# Patient Record
Sex: MALE | URBAN - METROPOLITAN AREA
[De-identification: names, ages, dates, MRNs, and addresses within clinical notes are randomized per-mention and may not be internally consistent; named-entity substitution may affect disease eponyms.]

---

## 2022-08-12 ENCOUNTER — WEB ENCOUNTER (OUTPATIENT)
Dept: URBAN - METROPOLITAN AREA CLINIC 96 | Facility: CLINIC | Age: 47
End: 2022-08-12

## 2022-08-16 ENCOUNTER — TELEPHONE ENCOUNTER (OUTPATIENT)
Dept: URBAN - METROPOLITAN AREA CLINIC 92 | Facility: CLINIC | Age: 47
End: 2022-08-16

## 2022-08-17 ENCOUNTER — DASHBOARD ENCOUNTERS (OUTPATIENT)
Age: 47
End: 2022-08-17

## 2022-08-17 ENCOUNTER — OFFICE VISIT (OUTPATIENT)
Dept: URBAN - METROPOLITAN AREA CLINIC 96 | Facility: CLINIC | Age: 47
End: 2022-08-17
Payer: COMMERCIAL

## 2022-08-17 VITALS
WEIGHT: 310 LBS | HEART RATE: 74 BPM | RESPIRATION RATE: 17 BRPM | OXYGEN SATURATION: 97 % | BODY MASS INDEX: 41.99 KG/M2 | DIASTOLIC BLOOD PRESSURE: 96 MMHG | SYSTOLIC BLOOD PRESSURE: 169 MMHG | TEMPERATURE: 97.8 F | HEIGHT: 72 IN

## 2022-08-17 DIAGNOSIS — K59.00 CONSTIPATION, UNSPECIFIED CONSTIPATION TYPE: ICD-10-CM

## 2022-08-17 DIAGNOSIS — E66.01 MORBID (SEVERE) OBESITY DUE TO EXCESS CALORIES: ICD-10-CM

## 2022-08-17 DIAGNOSIS — Z87.19 HISTORY OF DIVERTICULITIS: ICD-10-CM

## 2022-08-17 DIAGNOSIS — R19.4 CHANGE IN BOWEL HABITS: ICD-10-CM

## 2022-08-17 PROBLEM — 408512008: Status: ACTIVE | Noted: 2022-08-17

## 2022-08-17 PROBLEM — 83911000119104: Status: ACTIVE | Noted: 2022-08-17

## 2022-08-17 PROCEDURE — 99204 OFFICE O/P NEW MOD 45 MIN: CPT | Performed by: INTERNAL MEDICINE

## 2022-08-17 RX ORDER — MULTIVIT-MIN/IRON/FOLIC ACID/K 18-600-40
AS DIRECTED CAPSULE ORAL
Status: ACTIVE | COMMUNITY

## 2022-08-17 RX ORDER — LOSARTAN POTASSIUM 100 MG/1
1 TABLET TABLET ORAL ONCE A DAY
Status: ACTIVE | COMMUNITY

## 2022-08-17 RX ORDER — B-COMPLEX WITH VITAMIN C
1 TABLET TABLET ORAL ONCE A DAY
Status: ACTIVE | COMMUNITY

## 2022-08-17 RX ORDER — PNV NO.95/FERROUS FUM/FOLIC AC 28MG-0.8MG
AS DIRECTED TABLET ORAL
Status: ACTIVE | COMMUNITY

## 2022-08-17 RX ORDER — SODIUM SULFATE, MAGNESIUM SULFATE, AND POTASSIUM CHLORIDE 17.75; 2.7; 2.25 G/1; G/1; G/1
12 TABLETS TABLET ORAL
Qty: 24 TABLETS | Refills: 0 | OUTPATIENT
Start: 2022-08-17 | End: 2022-08-18

## 2022-08-17 RX ORDER — CHLORTHALIDONE 25 MG/1
1 TABLET IN THE MORNING WITH FOOD TABLET ORAL ONCE A DAY
Status: ACTIVE | COMMUNITY

## 2022-08-17 NOTE — PHYSICAL EXAM GASTROINTESTINAL
Abdomen , obese, soft, nontender, nondistended , no guarding or rigidity , no masses palpable , normal bowel sounds , Liver and Spleen,  no hepatosplenomegaly , liver nontender

## 2022-08-17 NOTE — HPI-TODAY'S VISIT:
46-year-old male previously remotely seen by Dr. Smyth on 4/14/2015.  At that time, patient was being evaluated for heartburn.  EGD performed 5/11/2015 with irregular Z-line at 40 cm.  Pathology with normal duodenal biopsies, normal gastric biopsies, GE junction biopsies with reflux esophagitis with no intestinal metaplasia.  Prior abdominal ultrasound 4/28/2015 with cholelithiasis as well as echogenic liver consistent with fatty infiltration.  Review of outside imaging performed at [facility] on 8/15/2022 with MRI of the chest demonstrating fatty tissue with multiple benign left axillary lymph nodes.  Ultrasound of the left axilla done on the same date with apparent soft tissue mass in the region of the patient's palpable abnormality, orthopedic referral and MRI was recommended.  Prior CT of the chest without contrast 6/20/2022 with multiple pulmonary nodules measuring no more than 4 mm.  Hepatic steatosis.  Cholelithiasis. To see surgeon for lipoma removal.   Patient reports prior episode of diverticulitits 2019, was seen ER at [facility] and CT was done and patient was told diverticulitis and was treated with antibiotics, admitted overnight. No surgery was indicated. Colonoscopy for follow up was normal per patient.   Reports will have "twinges" of pain in the LLQ. No f/c, no severe pain, no melena, no rectal bleeding, no unintentional weight loss.   No family hx of IBD, no family hx of GI CA. Brother and father with diverticulitis, brother required resection.  UTD with primary MD. Occasional constipation and will take Miralax prn. Stool "never back to normal" since diverticulitis. No abdominal pain, thinner stools since with softer stools.

## 2022-08-18 PROBLEM — 14760008: Status: ACTIVE | Noted: 2022-08-17

## 2022-10-14 ENCOUNTER — OFFICE VISIT (OUTPATIENT)
Dept: URBAN - METROPOLITAN AREA SURGERY CENTER 18 | Facility: SURGERY CENTER | Age: 47
End: 2022-10-14

## 2023-01-05 ENCOUNTER — TELEPHONE ENCOUNTER (OUTPATIENT)
Dept: URBAN - METROPOLITAN AREA CLINIC 96 | Facility: CLINIC | Age: 48
End: 2023-01-05

## 2023-01-13 ENCOUNTER — OFFICE VISIT (OUTPATIENT)
Dept: URBAN - METROPOLITAN AREA SURGERY CENTER 18 | Facility: SURGERY CENTER | Age: 48
End: 2023-01-13

## 2023-01-16 PROBLEM — 197119006 ACUTE CONSTIPATION: Status: ACTIVE | Noted: 2023-01-02

## 2023-01-16 PROBLEM — 408512008 BODY MASS INDEX 40+ - SEVERELY OBESE: Status: ACTIVE | Noted: 2023-01-02

## 2023-02-08 ENCOUNTER — WEB ENCOUNTER (OUTPATIENT)
Dept: URBAN - METROPOLITAN AREA SURGERY CENTER 18 | Facility: SURGERY CENTER | Age: 48
End: 2023-02-08

## 2023-02-10 ENCOUNTER — OFFICE VISIT (OUTPATIENT)
Dept: URBAN - METROPOLITAN AREA SURGERY CENTER 18 | Facility: SURGERY CENTER | Age: 48
End: 2023-02-10
Payer: COMMERCIAL

## 2023-02-10 DIAGNOSIS — K59.09 CHANGE IN BOWEL MOVEMENTS INTERMITTENT CONSTIPATION. URGENCY IN THE MORNING.: ICD-10-CM

## 2023-02-10 PROCEDURE — 45378 DIAGNOSTIC COLONOSCOPY: CPT | Performed by: INTERNAL MEDICINE

## 2023-02-10 PROCEDURE — G8907 PT DOC NO EVENTS ON DISCHARG: HCPCS | Performed by: INTERNAL MEDICINE
